# Patient Record
Sex: MALE | Race: WHITE | NOT HISPANIC OR LATINO | Employment: OTHER | URBAN - NONMETROPOLITAN AREA
[De-identification: names, ages, dates, MRNs, and addresses within clinical notes are randomized per-mention and may not be internally consistent; named-entity substitution may affect disease eponyms.]

---

## 2023-01-10 ENCOUNTER — OFFICE VISIT (OUTPATIENT)
Dept: FAMILY MEDICINE | Facility: CLINIC | Age: 48
End: 2023-01-10
Payer: OTHER GOVERNMENT

## 2023-01-10 VITALS
WEIGHT: 209 LBS | HEART RATE: 59 BPM | TEMPERATURE: 98 F | OXYGEN SATURATION: 99 % | BODY MASS INDEX: 29.26 KG/M2 | SYSTOLIC BLOOD PRESSURE: 122 MMHG | RESPIRATION RATE: 20 BRPM | DIASTOLIC BLOOD PRESSURE: 82 MMHG | HEIGHT: 71 IN

## 2023-01-10 DIAGNOSIS — R04.2 HEMOPTYSIS: Primary | ICD-10-CM

## 2023-01-10 PROCEDURE — 99202 OFFICE O/P NEW SF 15 MIN: CPT | Mod: ,,, | Performed by: NURSE PRACTITIONER

## 2023-01-10 PROCEDURE — 99202 PR OFFICE/OUTPT VISIT, NEW, LEVL II, 15-29 MIN: ICD-10-PCS | Mod: ,,, | Performed by: NURSE PRACTITIONER

## 2023-01-10 RX ORDER — EPINEPHRINE 0.3 MG/.3ML
INJECTION SUBCUTANEOUS ONCE
COMMUNITY
End: 2023-02-24 | Stop reason: SDUPTHER

## 2023-01-10 RX ORDER — ZOLPIDEM TARTRATE 10 MG/1
5 TABLET ORAL NIGHTLY PRN
COMMUNITY
End: 2023-02-24 | Stop reason: SDUPTHER

## 2023-01-10 NOTE — PROGRESS NOTES
"   FRANNY Welch   [facility]  [address]  [place]  [address]      PATIENT NAME: Cesar Corona  : 1975  DATE: 1/10/23  MRN: [id number]      Billing Provider: FRANNY Welch  Level of Service:   Patient PCP Information       Provider PCP Type    FRANNY Welch General            Reason for Visit / Chief Complaint: Cough (No longer has a cough/) and Hemoptysis (States he coughed  and started spitting up blood for approx 4-5 min./No longer spitting up blood.)       Update PCP  Update Chief Complaint         History of Present Illness / Problem Focused Workflow     Cesar Corona presents to the clinic with Cough (No longer has a cough/) and Hemoptysis (States he coughed  and started spitting up blood for approx 4-5 min./No longer spitting up blood.)     Patient presents to clinic with c/o hx of sinus congestion, cough  (). Patient reports symptoms resolved competely then returned after about a week. Report cough, congestion feeling a "tear" in his throat with hemoptysis following. Negative home covid test. Hemoptysis only occurred that one time without additional episodes. All symptoms have resolved.     Current smoker (10-15 cig x 30-40 years) occasionally vapes.       Review of Systems     @Review of Systems   Constitutional:  Negative for fatigue and fever.   HENT:  Negative for nasal congestion, ear pain, postnasal drip, rhinorrhea and sinus pressure/congestion.    Eyes:  Negative for discharge and itching.   Respiratory:  Negative for chest tightness, shortness of breath and wheezing.    Cardiovascular:  Negative for chest pain and palpitations.   Gastrointestinal:  Negative for abdominal pain, blood in stool, change in bowel habit and change in bowel habit.   Genitourinary:  Negative for dysuria.   Musculoskeletal:  Negative for joint swelling.   Neurological:  Negative for dizziness and headaches.     Medical / Social / Family History     Past Medical " History:   Diagnosis Date    Atrial fibrillation     Hyperlipidemia     Insomnia     Palpitations     Vitamin D deficiency        Past Surgical History:   Procedure Laterality Date    APPENDECTOMY      COLONOSCOPY  05/19/2022       Social History    reports that he has been smoking cigarettes. He uses smokeless tobacco. He reports current alcohol use. He reports that he does not use drugs.    Family History  's family history is not on file.    Medications and Allergies     Medications  Outpatient Medications Marked as Taking for the 1/10/23 encounter (Office Visit) with FRANNY Welch   Medication Sig Dispense Refill    EPINEPHrine (EPIPEN 2-DUSTY) 0.3 mg/0.3 mL AtIn Inject into the muscle once.      zolpidem (AMBIEN) 10 mg Tab Take 5 mg by mouth nightly as needed.         Allergies  Review of patient's allergies indicates:  No Known Allergies    Physical Examination     Vitals:    01/10/23 0931   BP:    Pulse: (!) 59   Resp:    Temp:      Physical Exam  Constitutional:       General: He is not in acute distress.     Appearance: Normal appearance.   Cardiovascular:      Rate and Rhythm: Normal rate and regular rhythm.   Pulmonary:      Effort: Pulmonary effort is normal. No respiratory distress.      Breath sounds: Normal breath sounds. No wheezing, rhonchi or rales.   Skin:     General: Skin is warm and dry.   Neurological:      Mental Status: He is alert.             No results found for: WBC, HGB, HCT, MCV, PLT       No results found for: NA, K, CL, CO2, GLU, BUN, CREATININE, CALCIUM, PROT, ALBUMIN, BILITOT, ALKPHOS, AST, ALT, ANIONGAP, ESTGFRAFRICA, EGFRNONAA   No image results found.     Procedures   Assessment and Plan (including Health Maintenance)      Problem List  Smart Sets  Document Outside HM   :    Plan:           Problem List Items Addressed This Visit          Pulmonary    Hemoptysis - Primary    Current Assessment & Plan     Smoking cessation  Referred to ENT for evaluation            Relevant Orders    Ambulatory referral/consult to ENT       The patient has no Health Maintenance topics of status Not Due    No future appointments.     Health Maintenance Due   Topic Date Due    Hepatitis C Screening  Never done    Lipid Panel  Never done    HIV Screening  Never done    TETANUS VACCINE  Never done    Colorectal Cancer Screening  Never done    COVID-19 Vaccine (3 - Booster for Moderna series) 06/17/2021    Influenza Vaccine (1) 09/01/2022        Follow up if symptoms worsen or fail to improve.     Signature:  FRANNY Welch  [address]  [address]    Date of encounter: 1/10/23

## 2023-02-24 ENCOUNTER — OFFICE VISIT (OUTPATIENT)
Dept: FAMILY MEDICINE | Facility: CLINIC | Age: 48
End: 2023-02-24
Payer: OTHER GOVERNMENT

## 2023-02-24 VITALS
DIASTOLIC BLOOD PRESSURE: 82 MMHG | RESPIRATION RATE: 18 BRPM | BODY MASS INDEX: 29.12 KG/M2 | HEART RATE: 58 BPM | OXYGEN SATURATION: 98 % | TEMPERATURE: 98 F | HEIGHT: 71 IN | WEIGHT: 208 LBS | SYSTOLIC BLOOD PRESSURE: 120 MMHG

## 2023-02-24 DIAGNOSIS — G47.00 INSOMNIA, UNSPECIFIED TYPE: ICD-10-CM

## 2023-02-24 DIAGNOSIS — L23.7 POISON IVY DERMATITIS: Primary | ICD-10-CM

## 2023-02-24 DIAGNOSIS — R11.2 NAUSEA AND VOMITING, UNSPECIFIED VOMITING TYPE: ICD-10-CM

## 2023-02-24 PROCEDURE — 99214 OFFICE O/P EST MOD 30 MIN: CPT | Mod: 25,,, | Performed by: NURSE PRACTITIONER

## 2023-02-24 PROCEDURE — 96372 THER/PROPH/DIAG INJ SC/IM: CPT | Mod: ,,, | Performed by: NURSE PRACTITIONER

## 2023-02-24 PROCEDURE — 96372 PR INJECTION,THERAP/PROPH/DIAG2ST, IM OR SUBCUT: ICD-10-PCS | Mod: ,,, | Performed by: NURSE PRACTITIONER

## 2023-02-24 PROCEDURE — 99214 PR OFFICE/OUTPT VISIT, EST, LEVL IV, 30-39 MIN: ICD-10-PCS | Mod: 25,,, | Performed by: NURSE PRACTITIONER

## 2023-02-24 RX ORDER — ONDANSETRON 4 MG/1
4 TABLET, ORALLY DISINTEGRATING ORAL EVERY 6 HOURS PRN
Qty: 20 TABLET | Refills: 0 | Status: SHIPPED | OUTPATIENT
Start: 2023-02-24 | End: 2023-03-14 | Stop reason: SDUPTHER

## 2023-02-24 RX ORDER — CLOBETASOL PROPIONATE 0.5 MG/G
CREAM TOPICAL 2 TIMES DAILY
Qty: 60 G | Refills: 1 | Status: SHIPPED | OUTPATIENT
Start: 2023-02-24

## 2023-02-24 RX ORDER — OMEPRAZOLE 40 MG/1
40 CAPSULE, DELAYED RELEASE ORAL DAILY
Qty: 30 CAPSULE | Refills: 3 | Status: SHIPPED | OUTPATIENT
Start: 2023-02-24 | End: 2023-02-24

## 2023-02-24 RX ORDER — EPINEPHRINE 0.3 MG/.3ML
1 INJECTION SUBCUTANEOUS ONCE
Qty: 1 EACH | Refills: 2 | Status: SHIPPED | OUTPATIENT
Start: 2023-02-24 | End: 2023-02-24

## 2023-02-24 RX ORDER — ONDANSETRON 4 MG/1
4 TABLET, ORALLY DISINTEGRATING ORAL EVERY 6 HOURS PRN
Qty: 20 TABLET | Refills: 0 | Status: SHIPPED | OUTPATIENT
Start: 2023-02-24 | End: 2023-02-24

## 2023-02-24 RX ORDER — DEXAMETHASONE SODIUM PHOSPHATE 4 MG/ML
4 INJECTION, SOLUTION INTRA-ARTICULAR; INTRALESIONAL; INTRAMUSCULAR; INTRAVENOUS; SOFT TISSUE
Status: COMPLETED | OUTPATIENT
Start: 2023-02-24 | End: 2023-02-24

## 2023-02-24 RX ORDER — PREDNISONE 10 MG/1
TABLET ORAL
Qty: 13 TABLET | Refills: 0 | Status: SHIPPED | OUTPATIENT
Start: 2023-02-24 | End: 2023-02-24

## 2023-02-24 RX ORDER — ZOLPIDEM TARTRATE 10 MG/1
10 TABLET ORAL NIGHTLY PRN
Qty: 30 TABLET | Refills: 0 | Status: SHIPPED | OUTPATIENT
Start: 2023-02-24

## 2023-02-24 RX ORDER — OMEPRAZOLE 40 MG/1
40 CAPSULE, DELAYED RELEASE ORAL DAILY
Qty: 30 CAPSULE | Refills: 3 | Status: SHIPPED | OUTPATIENT
Start: 2023-02-24 | End: 2023-03-14

## 2023-02-24 RX ORDER — ZOLPIDEM TARTRATE 10 MG/1
10 TABLET ORAL NIGHTLY PRN
Qty: 30 TABLET | Refills: 0 | Status: SHIPPED | OUTPATIENT
Start: 2023-02-24 | End: 2023-02-24

## 2023-02-24 RX ORDER — CLOBETASOL PROPIONATE 0.5 MG/G
CREAM TOPICAL 2 TIMES DAILY
Qty: 60 G | Refills: 1 | Status: SHIPPED | OUTPATIENT
Start: 2023-02-24 | End: 2023-02-24

## 2023-02-24 RX ORDER — PREDNISONE 10 MG/1
TABLET ORAL
Qty: 13 TABLET | Refills: 0 | Status: SHIPPED | OUTPATIENT
Start: 2023-02-24 | End: 2023-03-14

## 2023-02-24 RX ADMIN — DEXAMETHASONE SODIUM PHOSPHATE 4 MG: 4 INJECTION, SOLUTION INTRA-ARTICULAR; INTRALESIONAL; INTRAMUSCULAR; INTRAVENOUS; SOFT TISSUE at 12:02

## 2023-02-24 NOTE — PROGRESS NOTES
Susan Tejeda NP   [facility]  [address]  [place]  [address]      PATIENT NAME: Cesar Corona  : 1975  DATE: 23  MRN: [id number]      Billing Provider: Susan Tejeda NP  Level of Service:   Patient PCP Information       Provider PCP Type    FRANNY Welch General            Reason for Visit / Chief Complaint: Emesis (States this has been an on and off issue for around 2 months. States he feels nauseated about 15 min before he vomits then he feels fine. ), Nausea, Poison Ivy (Has rash from poison ivy on his face and shoulder. ), and Medication Refill       Update PCP  Update Chief Complaint         History of Present Illness / Problem Focused Workflow     Cesar Corona presents to the clinic with Emesis (States this has been an on and off issue for around 2 months. States he feels nauseated about 15 min before he vomits then he feels fine. ), Nausea, Poison Ivy (Has rash from poison ivy on his face and shoulder. ), and Medication Refill     47 year old male presents to clinic with complaints of rash from poison ivy on his face and shoulder. He states he has tried calamine lotion at home with no relief. He has additional complaints of nausea and vomiting for around 2 months. He states he will feel nauseated, then vomit, then he feels fine. He is also requesting medication for his routine meds as he just moved this area and does not have PCP.     Review of Systems     @Review of Systems   Constitutional:  Negative for activity change, appetite change, fatigue and fever.   HENT:  Negative for nasal congestion, ear pain, rhinorrhea, sinus pressure/congestion and sore throat.    Eyes:  Negative for pain, redness, visual disturbance and eye dryness.   Respiratory:  Negative for cough and shortness of breath.    Cardiovascular:  Negative for chest pain and leg swelling.   Gastrointestinal:  Positive for nausea and vomiting. Negative for abdominal distention, abdominal pain,  constipation and diarrhea.   Endocrine: Negative for cold intolerance, heat intolerance and polyuria.   Genitourinary:  Negative for bladder incontinence, dysuria, frequency and urgency.   Musculoskeletal:  Negative for arthralgias, gait problem and myalgias.   Integumentary:  Positive for rash. Negative for color change and wound.   Allergic/Immunologic: Negative for environmental allergies and food allergies.   Neurological:  Negative for dizziness, weakness, light-headedness and headaches.   Psychiatric/Behavioral:  Negative for behavioral problems and sleep disturbance.      Medical / Social / Family History     Past Medical History:   Diagnosis Date    Atrial fibrillation     Hyperlipidemia     Insomnia     Palpitations     Vitamin D deficiency        Past Surgical History:   Procedure Laterality Date    APPENDECTOMY      COLONOSCOPY  05/19/2022       Social History    reports that he has been smoking cigarettes. He has quit using smokeless tobacco. He reports current alcohol use. He reports that he does not use drugs.    Family History  's family history is not on file.    Medications and Allergies     Medications  Outpatient Medications Marked as Taking for the 2/24/23 encounter (Office Visit) with Susan Tejeda NP   Medication Sig Dispense Refill    [DISCONTINUED] EPINEPHrine (EPIPEN) 0.3 mg/0.3 mL AtIn Inject into the muscle once.      [DISCONTINUED] zolpidem (AMBIEN) 10 mg Tab Take 5 mg by mouth nightly as needed.         Allergies  Review of patient's allergies indicates:   Allergen Reactions    Wasp venom        Physical Examination     Vitals:    02/24/23 1147   BP:    Pulse: (!) 58   Resp:    Temp:      Physical Exam  Vitals and nursing note reviewed.   HENT:      Head: Normocephalic.      Right Ear: Tympanic membrane normal.      Left Ear: Tympanic membrane normal.      Nose: Nose normal.      Mouth/Throat:      Mouth: Mucous membranes are moist.      Pharynx: Oropharynx is clear. No  posterior oropharyngeal erythema.   Eyes:      Conjunctiva/sclera: Conjunctivae normal.   Cardiovascular:      Rate and Rhythm: Normal rate and regular rhythm.      Pulses: Normal pulses.      Heart sounds: Normal heart sounds.   Pulmonary:      Effort: Pulmonary effort is normal.      Breath sounds: Normal breath sounds.   Abdominal:      General: Abdomen is flat. Bowel sounds are normal. There is no distension.      Palpations: Abdomen is soft.      Tenderness: There is abdominal tenderness in the epigastric area.   Musculoskeletal:         General: No swelling or tenderness. Normal range of motion.      Cervical back: Normal range of motion.      Right lower leg: No edema.      Left lower leg: No edema.   Skin:     General: Skin is warm and dry.      Capillary Refill: Capillary refill takes less than 2 seconds.      Findings: Rash present. Rash is papular and vesicular.      Comments: Red raised rash to left side of face and left shoulder.    Neurological:      Mental Status: He is alert. Mental status is at baseline.   Psychiatric:         Mood and Affect: Mood normal.         Behavior: Behavior normal.             Lab Results   Component Value Date    WBC 6.86 03/14/2023    HGB 16.1 03/14/2023    HCT 48.1 03/14/2023    MCV 88.4 03/14/2023     03/14/2023          Sodium   Date Value Ref Range Status   03/14/2023 140 136 - 145 mmol/L Final     Potassium   Date Value Ref Range Status   03/14/2023 4.2 3.5 - 5.1 mmol/L Final     Chloride   Date Value Ref Range Status   03/14/2023 111 (H) 98 - 107 mmol/L Final     CO2   Date Value Ref Range Status   03/14/2023 29 21 - 32 mmol/L Final     Glucose   Date Value Ref Range Status   03/14/2023 104 74 - 106 mg/dL Final     BUN   Date Value Ref Range Status   03/14/2023 14 7 - 18 mg/dL Final     Creatinine   Date Value Ref Range Status   03/14/2023 1.23 0.70 - 1.30 mg/dL Final     Calcium   Date Value Ref Range Status   03/14/2023 9.3 8.5 - 10.1 mg/dL Final     Total  Protein   Date Value Ref Range Status   03/14/2023 7.3 6.4 - 8.2 g/dL Final     Albumin   Date Value Ref Range Status   03/14/2023 4.3 3.5 - 5.0 g/dL Final     Bilirubin, Total   Date Value Ref Range Status   03/14/2023 0.6 >0.0 - 1.2 mg/dL Final     Alk Phos   Date Value Ref Range Status   03/14/2023 51 45 - 115 U/L Final     AST   Date Value Ref Range Status   03/14/2023 13 (L) 15 - 37 U/L Final     ALT   Date Value Ref Range Status   03/14/2023 40 16 - 61 U/L Final     Anion Gap   Date Value Ref Range Status   03/14/2023 4 (L) 7 - 16 mmol/L Final      No image results found.     Procedures   Assessment and Plan (including Health Maintenance)      Problem List  Smart Sets  Document Outside HM   :    Plan:           Problem List Items Addressed This Visit          GI    Nausea and vomiting    Current Assessment & Plan     Zofran as needed for nausea. He did have some epigastric tenderness so I suspect GERD. Will start him on Omeprazole. IF symptoms fail to improve will refer to GI.      Reviewed pathology of current symptoms, medication side effects/risk/benefits/directions on taking medications, and to take medication as prescribed. Take medication as prescribed. Remain upright for at least one hour after eating or drinking. Eat small, frequent bland meals instead of large meals. Avoid greasy/spicy/acidic foods. Avoid alcohol and NSAIDS. Monitor for changes in bowel color, texture, etc. Discussed what warrants emergent follow-up or treatment. Patient is to go to ED if they begin vomiting and it looks like blood or coffee grounds                Other    Poison ivy dermatitis - Primary    Current Assessment & Plan     Decadron IM given in clinic.   Prednisone as ordered.   Clobetasol cream as ordered.   Reviewed pathology of current symptoms, treatment plan, medication side effects/risk/benefits/directions on taking medications,keep areas clean and dry, wash with mild soaps and pat dry, oatmeal baths, discussed  worsening symptoms to monitor for that require a re-evaluation in clinic or if after hours to go to the ER. Discussed the pros/cons of steroids, and patient wishes to proceed. Discussed H1/H2 use. Patient verbalized understanding of treatment plan and denies any questions.             Insomnia    Current Assessment & Plan      checked and is OK. No signs of aberrant behavior. Instructed patient to only take medication as instructed and never share medications with others.   Will refill Ambien as he reports his insomnia is well controlled with this medication.               The patient has no Health Maintenance topics of status Not Due    No future appointments.     Health Maintenance Due   Topic Date Due    Hepatitis C Screening  Never done    Lipid Panel  Never done    Pneumococcal Vaccines (Age 0-64) (1 - PCV) Never done    HIV Screening  Never done    TETANUS VACCINE  Never done    Hemoglobin A1c (Diabetic Prevention Screening)  Never done    Colorectal Cancer Screening  Never done    COVID-19 Vaccine (3 - Booster for Moderna series) 06/17/2021    Influenza Vaccine (1) 09/01/2022        No follow-ups on file.     Signature:  Susan Tejeda NP  [facility]  [address]  [address]    Date of encounter: 2/24/23

## 2023-03-14 ENCOUNTER — OFFICE VISIT (OUTPATIENT)
Dept: FAMILY MEDICINE | Facility: CLINIC | Age: 48
End: 2023-03-14
Payer: OTHER GOVERNMENT

## 2023-03-14 VITALS
HEART RATE: 63 BPM | HEIGHT: 71 IN | WEIGHT: 202.63 LBS | DIASTOLIC BLOOD PRESSURE: 76 MMHG | TEMPERATURE: 98 F | BODY MASS INDEX: 28.37 KG/M2 | SYSTOLIC BLOOD PRESSURE: 130 MMHG | OXYGEN SATURATION: 96 % | RESPIRATION RATE: 18 BRPM

## 2023-03-14 DIAGNOSIS — B99.9: Primary | ICD-10-CM

## 2023-03-14 DIAGNOSIS — R11.0 CHRONIC NAUSEA: ICD-10-CM

## 2023-03-14 DIAGNOSIS — R11.10: Primary | ICD-10-CM

## 2023-03-14 LAB
ALBUMIN SERPL BCP-MCNC: 4.3 G/DL (ref 3.5–5)
ALBUMIN/GLOB SERPL: 1.4 {RATIO}
ALP SERPL-CCNC: 51 U/L (ref 45–115)
ALT SERPL W P-5'-P-CCNC: 40 U/L (ref 16–61)
ANION GAP SERPL CALCULATED.3IONS-SCNC: 4 MMOL/L (ref 7–16)
AST SERPL W P-5'-P-CCNC: 13 U/L (ref 15–37)
BASOPHILS # BLD AUTO: 0.1 K/UL (ref 0–0.2)
BASOPHILS NFR BLD AUTO: 1.5 % (ref 0–1)
BILIRUB SERPL-MCNC: 0.6 MG/DL (ref ?–1.2)
BUN SERPL-MCNC: 14 MG/DL (ref 7–18)
BUN/CREAT SERPL: 11 (ref 6–20)
CALCIUM SERPL-MCNC: 9.3 MG/DL (ref 8.5–10.1)
CHLORIDE SERPL-SCNC: 111 MMOL/L (ref 98–107)
CO2 SERPL-SCNC: 29 MMOL/L (ref 21–32)
CREAT SERPL-MCNC: 1.23 MG/DL (ref 0.7–1.3)
CRP SERPL-MCNC: <0.29 MG/DL (ref 0–0.8)
DIFFERENTIAL METHOD BLD: ABNORMAL
EGFR (NO RACE VARIABLE) (RUSH/TITUS): 73 ML/MIN/1.73M²
EOSINOPHIL # BLD AUTO: 0.09 K/UL (ref 0–0.5)
EOSINOPHIL NFR BLD AUTO: 1.3 % (ref 1–4)
ERYTHROCYTE [DISTWIDTH] IN BLOOD BY AUTOMATED COUNT: 12.8 % (ref 11.5–14.5)
ERYTHROCYTE [SEDIMENTATION RATE] IN BLOOD BY WESTERGREN METHOD: 5 MM/HR (ref 0–15)
GLOBULIN SER-MCNC: 3 G/DL (ref 2–4)
GLUCOSE SERPL-MCNC: 104 MG/DL (ref 74–106)
HCT VFR BLD AUTO: 48.1 % (ref 40–54)
HGB BLD-MCNC: 16.1 G/DL (ref 13.5–18)
IMM GRANULOCYTES # BLD AUTO: 0.04 K/UL (ref 0–0.04)
IMM GRANULOCYTES NFR BLD: 0.6 % (ref 0–0.4)
LYMPHOCYTES # BLD AUTO: 2.17 K/UL (ref 1–4.8)
LYMPHOCYTES NFR BLD AUTO: 31.6 % (ref 27–41)
MCH RBC QN AUTO: 29.6 PG (ref 27–31)
MCHC RBC AUTO-ENTMCNC: 33.5 G/DL (ref 32–36)
MCV RBC AUTO: 88.4 FL (ref 80–96)
MONOCYTES # BLD AUTO: 0.43 K/UL (ref 0–0.8)
MONOCYTES NFR BLD AUTO: 6.3 % (ref 2–6)
MPC BLD CALC-MCNC: 10.3 FL (ref 9.4–12.4)
NEUTROPHILS # BLD AUTO: 4.03 K/UL (ref 1.8–7.7)
NEUTROPHILS NFR BLD AUTO: 58.7 % (ref 53–65)
NRBC # BLD AUTO: 0 X10E3/UL
NRBC, AUTO (.00): 0 %
PLATELET # BLD AUTO: 315 K/UL (ref 150–400)
POTASSIUM SERPL-SCNC: 4.2 MMOL/L (ref 3.5–5.1)
PROT SERPL-MCNC: 7.3 G/DL (ref 6.4–8.2)
RBC # BLD AUTO: 5.44 M/UL (ref 4.6–6.2)
SODIUM SERPL-SCNC: 140 MMOL/L (ref 136–145)
WBC # BLD AUTO: 6.86 K/UL (ref 4.5–11)

## 2023-03-14 PROCEDURE — 85025 CBC WITH DIFFERENTIAL: ICD-10-PCS | Mod: ,,, | Performed by: CLINICAL MEDICAL LABORATORY

## 2023-03-14 PROCEDURE — 86140 C-REACTIVE PROTEIN: CPT | Mod: ,,, | Performed by: CLINICAL MEDICAL LABORATORY

## 2023-03-14 PROCEDURE — 85651 RBC SED RATE NONAUTOMATED: CPT | Mod: ,,, | Performed by: CLINICAL MEDICAL LABORATORY

## 2023-03-14 PROCEDURE — 85651 SEDIMENTATION RATE, AUTOMATED: ICD-10-PCS | Mod: ,,, | Performed by: CLINICAL MEDICAL LABORATORY

## 2023-03-14 PROCEDURE — 99213 OFFICE O/P EST LOW 20 MIN: CPT | Mod: ,,, | Performed by: NURSE PRACTITIONER

## 2023-03-14 PROCEDURE — 80053 COMPREHENSIVE METABOLIC PANEL: ICD-10-PCS | Mod: ,,, | Performed by: CLINICAL MEDICAL LABORATORY

## 2023-03-14 PROCEDURE — 80053 COMPREHEN METABOLIC PANEL: CPT | Mod: ,,, | Performed by: CLINICAL MEDICAL LABORATORY

## 2023-03-14 PROCEDURE — 85025 COMPLETE CBC W/AUTO DIFF WBC: CPT | Mod: ,,, | Performed by: CLINICAL MEDICAL LABORATORY

## 2023-03-14 PROCEDURE — 99213 PR OFFICE/OUTPT VISIT, EST, LEVL III, 20-29 MIN: ICD-10-PCS | Mod: ,,, | Performed by: NURSE PRACTITIONER

## 2023-03-14 PROCEDURE — 86140 C-REACTIVE PROTEIN: ICD-10-PCS | Mod: ,,, | Performed by: CLINICAL MEDICAL LABORATORY

## 2023-03-14 RX ORDER — METOCLOPRAMIDE 10 MG/1
10 TABLET ORAL
Qty: 45 TABLET | Refills: 1 | Status: SHIPPED | OUTPATIENT
Start: 2023-03-14

## 2023-03-14 RX ORDER — ONDANSETRON 4 MG/1
4 TABLET, ORALLY DISINTEGRATING ORAL EVERY 6 HOURS PRN
Qty: 20 TABLET | Refills: 1 | Status: SHIPPED | OUTPATIENT
Start: 2023-03-14

## 2023-03-15 PROCEDURE — 87209 OVA AND PARASITE EXAM: ICD-10-PCS | Mod: ,,, | Performed by: CLINICAL MEDICAL LABORATORY

## 2023-03-15 PROCEDURE — 87209 SMEAR COMPLEX STAIN: CPT | Mod: ,,, | Performed by: CLINICAL MEDICAL LABORATORY

## 2023-03-15 PROCEDURE — 87177 OVA AND PARASITES SMEARS: CPT | Mod: ,,, | Performed by: CLINICAL MEDICAL LABORATORY

## 2023-03-15 PROCEDURE — 87177 OVA AND PARASITE EXAM: ICD-10-PCS | Mod: ,,, | Performed by: CLINICAL MEDICAL LABORATORY

## 2023-03-16 NOTE — PROGRESS NOTES
FRANNY Patel   [facility]  [address] [place] [address]  [phone]      PATIENT NAME: Cesar Corona  : 1975  DATE: 3/14/23  MRN: [id number]      Billing Provider: FRANNY Patel  Level of Service: [facility]  Patient PCP Information       Provider PCP Type    FRANNY Welch General            Reason for Visit / Chief Complaint: Nausea and Emesis (Seen FRANNY Celis 23 for n/v./Rx'd prilosec and zofran./States hes vomited approx 6 times since that ov.)       Update PCP  Update Chief Complaint         History of Present Illness / Problem Focused Workflow       47 year old male presents with complaints of nausea and vomiting intermittently for several weeks  He was seen in clinic ; but continues to have symptoms  Denies any known fever  Reports 1-2 episodes of diarrhea during this time    Hx of A-fib, hyperlipidemia, insomnia, palpitations, vit D def      Review of Systems     Review of Systems   Constitutional:  Negative for chills, fatigue and fever.   HENT:  Negative for congestion.    Respiratory:  Negative for cough.    Cardiovascular:  Positive for palpitations. Negative for chest pain.   Gastrointestinal:  Positive for abdominal pain, diarrhea, nausea and vomiting. Negative for constipation.   Musculoskeletal:  Negative for gait problem.   Allergic/Immunologic: Negative for environmental allergies.   Neurological:  Negative for dizziness, weakness and headaches.   Psychiatric/Behavioral:  Positive for sleep disturbance. Negative for agitation and dysphoric mood.      Medical / Social / Family History     Past Medical History:   Diagnosis Date    Atrial fibrillation     Hyperlipidemia     Insomnia     Palpitations     Vitamin D deficiency        Past Surgical History:   Procedure Laterality Date    APPENDECTOMY      COLONOSCOPY  2022       Social History  Mr. cabrera  that he has been smoking cigarettes. He has quit using smokeless tobacco. He reports current alcohol use. He reports that he does not use drugs.    Family History  Mr.'s family history is not on file.    Medications and Allergies     Medications  Outpatient Medications Marked as Taking for the 3/14/23 encounter (Office Visit) with FRANNY Patel   Medication Sig Dispense Refill    clobetasoL (TEMOVATE) 0.05 % cream Apply topically 2 (two) times daily. 60 g 1    zolpidem (AMBIEN) 10 mg Tab Take 1 tablet (10 mg total) by mouth nightly as needed (Insomnia). 30 tablet 0    [DISCONTINUED] omeprazole (PRILOSEC) 40 MG capsule Take 1 capsule (40 mg total) by mouth once daily. 30 capsule 3    [DISCONTINUED] ondansetron (ZOFRAN-ODT) 4 MG TbDL Take 1 tablet (4 mg total) by mouth every 6 (six) hours as needed (nausea). 20 tablet 0       Allergies  Review of patient's allergies indicates:   Allergen Reactions    Wasp venom        Physical Examination     Vitals:    03/14/23 1431   BP: 130/76   Pulse: 63   Resp: 18   Temp: 98.2 °F (36.8 °C)     Physical Exam  Constitutional:       General: He is not in acute distress.  HENT:      Head: Normocephalic.      Nose: Nose normal.      Mouth/Throat:      Mouth: Mucous membranes are moist.   Eyes:      Extraocular Movements: Extraocular movements intact.   Cardiovascular:      Rate and Rhythm: Normal rate.   Pulmonary:      Effort: Pulmonary effort is normal. No respiratory distress.   Abdominal:      General: Bowel sounds are normal.      Palpations: Abdomen is soft.      Tenderness: There is abdominal tenderness. There is no guarding.   Musculoskeletal:         General: Normal range of motion.      Cervical back: Neck supple.   Skin:     General: Skin is warm.   Neurological:      Mental Status: He is alert and oriented to person, place, and time.   Psychiatric:         Behavior: Behavior normal.         Imaging / Labs     Office Visit on 03/14/2023   Component Date Value Ref Range  Status    Ova and Parasite Exam 03/15/2023 No Ova and Parasites Seen  No Ova and Parasites Seen Final    Trichrome Smear 03/15/2023 No Ova and Parasites Seen  No Ova and Parasites Seen, Not Performed Final    Sodium 03/14/2023 140  136 - 145 mmol/L Final    Potassium 03/14/2023 4.2  3.5 - 5.1 mmol/L Final    Chloride 03/14/2023 111 (H)  98 - 107 mmol/L Final    CO2 03/14/2023 29  21 - 32 mmol/L Final    Anion Gap 03/14/2023 4 (L)  7 - 16 mmol/L Final    Glucose 03/14/2023 104  74 - 106 mg/dL Final    BUN 03/14/2023 14  7 - 18 mg/dL Final    Creatinine 03/14/2023 1.23  0.70 - 1.30 mg/dL Final    BUN/Creatinine Ratio 03/14/2023 11  6 - 20 Final    Calcium 03/14/2023 9.3  8.5 - 10.1 mg/dL Final    Total Protein 03/14/2023 7.3  6.4 - 8.2 g/dL Final    Albumin 03/14/2023 4.3  3.5 - 5.0 g/dL Final    Globulin 03/14/2023 3.0  2.0 - 4.0 g/dL Final    A/G Ratio 03/14/2023 1.4   Final    Bilirubin, Total 03/14/2023 0.6  >0.0 - 1.2 mg/dL Final    Alk Phos 03/14/2023 51  45 - 115 U/L Final    ALT 03/14/2023 40  16 - 61 U/L Final    AST 03/14/2023 13 (L)  15 - 37 U/L Final    eGFR 03/14/2023 73  >=60 mL/min/1.73m² Final    CRP 03/14/2023 <0.29  0.00 - 0.80 mg/dL Final    ESR Westergren 03/14/2023 5  0 - 15 mm/Hr Final    WBC 03/14/2023 6.86  4.50 - 11.00 K/uL Final    RBC 03/14/2023 5.44  4.60 - 6.20 M/uL Final    Hemoglobin 03/14/2023 16.1  13.5 - 18.0 g/dL Final    Hematocrit 03/14/2023 48.1  40.0 - 54.0 % Final    MCV 03/14/2023 88.4  80.0 - 96.0 fL Final    MCH 03/14/2023 29.6  27.0 - 31.0 pg Final    MCHC 03/14/2023 33.5  32.0 - 36.0 g/dL Final    RDW 03/14/2023 12.8  11.5 - 14.5 % Final    Platelet Count 03/14/2023 315  150 - 400 K/uL Final    MPV 03/14/2023 10.3  9.4 - 12.4 fL Final    Neutrophils % 03/14/2023 58.7  53.0 - 65.0 % Final    Lymphocytes % 03/14/2023 31.6  27.0 - 41.0 % Final    Monocytes % 03/14/2023 6.3 (H)  2.0 - 6.0 % Final    Eosinophils % 03/14/2023 1.3  1.0 - 4.0 % Final    Basophils % 03/14/2023 1.5  (H)  0.0 - 1.0 % Final    Immature Granulocytes % 03/14/2023 0.6 (H)  0.0 - 0.4 % Final    nRBC, Auto 03/14/2023 0.0  <=0.0 % Final    Neutrophils, Abs 03/14/2023 4.03  1.80 - 7.70 K/uL Final    Lymphocytes, Absolute 03/14/2023 2.17  1.00 - 4.80 K/uL Final    Monocytes, Absolute 03/14/2023 0.43  0.00 - 0.80 K/uL Final    Eosinophils, Absolute 03/14/2023 0.09  0.00 - 0.50 K/uL Final    Basophils, Absolute 03/14/2023 0.10  0.00 - 0.20 K/uL Final    Immature Granulocytes, Absolute 03/14/2023 0.04  0.00 - 0.04 K/uL Final    nRBC, Absolute 03/14/2023 0.00  <=0.00 x10e3/uL Final    Diff Type 03/14/2023 Auto   Final     No image results found.      Assessment and Plan (including Health Maintenance)      Problem List  Smart Sets  Document Outside HM   :    Health Maintenance Due   Topic Date Due    Hepatitis C Screening  Never done    Lipid Panel  Never done    Pneumococcal Vaccines (Age 0-64) (1 - PCV) Never done    HIV Screening  Never done    TETANUS VACCINE  Never done    Hemoglobin A1c (Diabetic Prevention Screening)  Never done    Colorectal Cancer Screening  Never done    COVID-19 Vaccine (3 - Booster for Moderna series) 06/17/2021    Influenza Vaccine (1) 09/01/2022       Problem List Items Addressed This Visit    None  Visit Diagnoses       Vomiting concurrent with and due to infectious disease    -  Primary    Relevant Medications    metroNIDAZOLE (FLAGYL) 500 MG tablet    amoxicillin (AMOXIL) 500 MG capsule    Other Relevant Orders    CBC Auto Differential (Completed)    Ova and Parasite Exam (Completed)    C-Reactive Protein (Completed)    Sedimentation Rate (Completed)    Ambulatory referral/consult to Gastroenterology    Chronic nausea        Relevant Medications    ondansetron (ZOFRAN-ODT) 4 MG TbDL    metoclopramide HCl (REGLAN) 10 MG tablet    Other Relevant Orders    CBC Auto Differential (Completed)    Comprehensive Metabolic Panel (Completed)    Ova and Parasite Exam (Completed)    C-Reactive Protein  (Completed)    Sedimentation Rate (Completed)    Ambulatory referral/consult to Gastroenterology        Treat for n/v  Labs today; will treat as indicated  Collect stool for o&a  [place] diet and prn meds  Start on reglan  Consider GI consult  Follow up after labs return         Signature:  FRANNY Patel [facility] [address] [place] [address]  [phone]    Date of encounter: 3/14/23

## 2023-03-17 LAB
O+P STL CONC: NORMAL
TRICHROME SMEAR: NORMAL

## 2023-03-20 RX ORDER — METRONIDAZOLE 500 MG/1
500 TABLET ORAL EVERY 12 HOURS
Qty: 20 TABLET | Refills: 0 | Status: SHIPPED | OUTPATIENT
Start: 2023-03-20

## 2023-03-20 RX ORDER — AMOXICILLIN 500 MG/1
500 CAPSULE ORAL EVERY 12 HOURS
Qty: 20 CAPSULE | Refills: 0 | Status: SHIPPED | OUTPATIENT
Start: 2023-03-20

## 2023-04-10 PROBLEM — R11.2 NAUSEA AND VOMITING: Status: ACTIVE | Noted: 2023-04-10

## 2023-04-10 PROBLEM — G47.00 INSOMNIA: Status: ACTIVE | Noted: 2023-04-10

## 2023-04-10 PROBLEM — L23.7 POISON IVY DERMATITIS: Status: ACTIVE | Noted: 2023-04-10

## 2023-04-11 NOTE — ASSESSMENT & PLAN NOTE
Zofran as needed for nausea. He did have some epigastric tenderness so I suspect GERD. Will start him on Omeprazole. IF symptoms fail to improve will refer to GI.      Reviewed pathology of current symptoms, medication side effects/risk/benefits/directions on taking medications, and to take medication as prescribed. Take medication as prescribed. Remain upright for at least one hour after eating or drinking. Eat small, frequent bland meals instead of large meals. Avoid greasy/spicy/acidic foods. Avoid alcohol and NSAIDS. Monitor for changes in bowel color, texture, etc. Discussed what warrants emergent follow-up or treatment. Patient is to go to ED if they begin vomiting and it looks like blood or coffee grounds

## 2023-04-11 NOTE — ASSESSMENT & PLAN NOTE
checked and is OK. No signs of aberrant behavior. Instructed patient to only take medication as instructed and never share medications with others.   Will refill Ambien as he reports his insomnia is well controlled with this medication.

## 2023-04-11 NOTE — ASSESSMENT & PLAN NOTE
Decadron IM given in clinic.   Prednisone as ordered.   Clobetasol cream as ordered.   Reviewed pathology of current symptoms, treatment plan, medication side effects/risk/benefits/directions on taking medications,keep areas clean and dry, wash with mild soaps and pat dry, oatmeal baths, discussed worsening symptoms to monitor for that require a re-evaluation in clinic or if after hours to go to the ER. Discussed the pros/cons of steroids, and patient wishes to proceed. Discussed H1/H2 use. Patient verbalized understanding of treatment plan and denies any questions.